# Patient Record
Sex: MALE | Race: WHITE | Employment: STUDENT | URBAN - METROPOLITAN AREA
[De-identification: names, ages, dates, MRNs, and addresses within clinical notes are randomized per-mention and may not be internally consistent; named-entity substitution may affect disease eponyms.]

---

## 2021-08-16 ENCOUNTER — OFFICE VISIT (OUTPATIENT)
Dept: INTERNAL MEDICINE CLINIC | Facility: CLINIC | Age: 21
End: 2021-08-16
Payer: COMMERCIAL

## 2021-08-16 VITALS
RESPIRATION RATE: 16 BRPM | HEIGHT: 66 IN | OXYGEN SATURATION: 98 % | HEART RATE: 78 BPM | SYSTOLIC BLOOD PRESSURE: 118 MMHG | WEIGHT: 134 LBS | DIASTOLIC BLOOD PRESSURE: 64 MMHG | BODY MASS INDEX: 21.53 KG/M2

## 2021-08-16 DIAGNOSIS — Z00.00 ROUTINE HEALTH MAINTENANCE: ICD-10-CM

## 2021-08-16 DIAGNOSIS — Z00.00 ANNUAL PHYSICAL EXAM: Primary | ICD-10-CM

## 2021-08-16 DIAGNOSIS — F84.0 AUTISM DISORDER: ICD-10-CM

## 2021-08-16 PROCEDURE — 3008F BODY MASS INDEX DOCD: CPT | Performed by: INTERNAL MEDICINE

## 2021-08-16 PROCEDURE — 99385 PREV VISIT NEW AGE 18-39: CPT | Performed by: INTERNAL MEDICINE

## 2021-08-16 PROCEDURE — 3078F DIAST BP <80 MM HG: CPT | Performed by: INTERNAL MEDICINE

## 2021-08-16 PROCEDURE — 3074F SYST BP LT 130 MM HG: CPT | Performed by: INTERNAL MEDICINE

## 2021-08-16 NOTE — PROGRESS NOTES
Laine Moss III is a 22year old male. HPI:   No chief complaint on file. Keisha Marinelli is here with his mom and dad. History obtained from mother. Keisha Marinelli diagnosed with autism. He is in special needs school.   He sees Linnea Enamorado who he has an intere in no apparent distress  SKIN:  no rashes , no suspicious lesions  HEENT: atraumatic. Pharynx normal without exudate. EYES:  PERRL. Sclera anicteric. NECK:  Supple,  no adenopathy,  thyroid normal  LUNGS:  clear to auscultation.   Effort normal  CARDIO: Abdominal Pain, N/V/D

## 2022-06-21 ENCOUNTER — TELEPHONE (OUTPATIENT)
Dept: INTERNAL MEDICINE CLINIC | Facility: CLINIC | Age: 22
End: 2022-06-21

## 2022-06-21 NOTE — TELEPHONE ENCOUNTER
Formed printed and left at . Spoke to pt mother and relayed instructions. Pt mother verbalized understanding and agrees with try and  forms today.

## 2022-06-21 NOTE — TELEPHONE ENCOUNTER
Received fax from mother for school form for medication authorization administration. Form filled out and signed by Dr. Lindsay Balderas. Copy made and sent to scanning.

## 2022-06-21 NOTE — TELEPHONE ENCOUNTER
Patient's mom moriahernestocr Siri is calling she will pick the form up in the office.   If possible she would like to pick this up today 6/21  Please call when ready [phone]

## 2023-12-01 ENCOUNTER — OFFICE VISIT (OUTPATIENT)
Dept: INTERNAL MEDICINE CLINIC | Facility: CLINIC | Age: 23
End: 2023-12-01

## 2023-12-01 VITALS
HEIGHT: 66 IN | DIASTOLIC BLOOD PRESSURE: 70 MMHG | SYSTOLIC BLOOD PRESSURE: 124 MMHG | RESPIRATION RATE: 20 BRPM | WEIGHT: 157 LBS | TEMPERATURE: 98 F | OXYGEN SATURATION: 100 % | BODY MASS INDEX: 25.23 KG/M2 | HEART RATE: 87 BPM

## 2023-12-01 DIAGNOSIS — Z00.00 ANNUAL PHYSICAL EXAM: Primary | ICD-10-CM

## 2023-12-01 DIAGNOSIS — F84.0 AUTISM: ICD-10-CM

## 2023-12-01 PROCEDURE — 99395 PREV VISIT EST AGE 18-39: CPT | Performed by: INTERNAL MEDICINE

## 2023-12-01 PROCEDURE — 3078F DIAST BP <80 MM HG: CPT | Performed by: INTERNAL MEDICINE

## 2023-12-01 PROCEDURE — 3008F BODY MASS INDEX DOCD: CPT | Performed by: INTERNAL MEDICINE

## 2023-12-01 PROCEDURE — 3074F SYST BP LT 130 MM HG: CPT | Performed by: INTERNAL MEDICINE

## 2023-12-10 LAB
ABSOLUTE BASOPHILS: 50 CELLS/UL (ref 0–200)
ABSOLUTE EOSINOPHILS: 138 CELLS/UL (ref 15–500)
ABSOLUTE LYMPHOCYTES: 1920 CELLS/UL (ref 850–3900)
ABSOLUTE MONOCYTES: 347 CELLS/UL (ref 200–950)
ABSOLUTE NEUTROPHILS: 3047 CELLS/UL (ref 1500–7800)
ALBUMIN/GLOBULIN RATIO: 2.2 (CALC) (ref 1–2.5)
ALBUMIN: 4.6 G/DL (ref 3.6–5.1)
ALKALINE PHOSPHATASE: 64 U/L (ref 36–130)
ALT: 50 U/L (ref 9–46)
AST: 31 U/L (ref 10–40)
BASOPHILS: 0.9 %
BILIRUBIN, TOTAL: 0.7 MG/DL (ref 0.2–1.2)
BUN: 13 MG/DL (ref 7–25)
CALCIUM: 9.6 MG/DL (ref 8.6–10.3)
CARBON DIOXIDE: 28 MMOL/L (ref 20–32)
CHLORIDE: 102 MMOL/L (ref 98–110)
CHOL/HDLC RATIO: 4.5 (CALC)
CHOLESTEROL, TOTAL: 198 MG/DL
CREATININE: 0.94 MG/DL (ref 0.6–1.24)
EGFR: 118 ML/MIN/1.73M2
EOSINOPHILS: 2.5 %
GLOBULIN: 2.1 G/DL (CALC) (ref 1.9–3.7)
GLUCOSE: 90 MG/DL (ref 65–99)
HDL CHOLESTEROL: 44 MG/DL
HEMATOCRIT: 46.4 % (ref 38.5–50)
HEMOGLOBIN A1C: 4.9 % OF TOTAL HGB
HEMOGLOBIN: 15.6 G/DL (ref 13.2–17.1)
LDL-CHOLESTEROL: 127 MG/DL (CALC)
LYMPHOCYTES: 34.9 %
MCH: 30.2 PG (ref 27–33)
MCHC: 33.6 G/DL (ref 32–36)
MCV: 89.9 FL (ref 80–100)
MONOCYTES: 6.3 %
MPV: 10.3 FL (ref 7.5–12.5)
NEUTROPHILS: 55.4 %
NON-HDL CHOLESTEROL: 154 MG/DL (CALC)
PLATELET COUNT: 281 THOUSAND/UL (ref 140–400)
POTASSIUM: 4.3 MMOL/L (ref 3.5–5.3)
PROTEIN, TOTAL: 6.7 G/DL (ref 6.1–8.1)
RDW: 12.1 % (ref 11–15)
RED BLOOD CELL COUNT: 5.16 MILLION/UL (ref 4.2–5.8)
SODIUM: 139 MMOL/L (ref 135–146)
TRIGLYCERIDES: 157 MG/DL
TSH: 0.72 MIU/L (ref 0.4–4.5)
VITAMIN D, 25-OH, TOTAL: 38 NG/ML (ref 30–100)
WHITE BLOOD CELL COUNT: 5.5 THOUSAND/UL (ref 3.8–10.8)

## 2023-12-11 ENCOUNTER — TELEPHONE (OUTPATIENT)
Dept: INTERNAL MEDICINE CLINIC | Facility: CLINIC | Age: 23
End: 2023-12-11

## 2023-12-11 NOTE — TELEPHONE ENCOUNTER
Please call mother Nikki Hassan and let her know her son Frederick's test results came out I believe satisfactory. His chemistries were good. His blood count was good. His thyroid was good. His cholesterol was just a little bit elevated but for now other than watching high fatty foods nothing else to be done about that and he is hemoglobin A1c was good at 4.9. Therefore no diabetes. Left copy in outbox if she wishes to have it mailed to her.

## 2023-12-11 NOTE — TELEPHONE ENCOUNTER
Called patient , spoke with mother per HIPAA and relayed DR. ISSA message - verbalized understanding    Mailed results to patients home

## 2024-11-22 ENCOUNTER — OFFICE VISIT (OUTPATIENT)
Dept: INTERNAL MEDICINE CLINIC | Facility: CLINIC | Age: 24
End: 2024-11-22

## 2024-11-22 VITALS
SYSTOLIC BLOOD PRESSURE: 112 MMHG | BODY MASS INDEX: 24.59 KG/M2 | OXYGEN SATURATION: 97 % | DIASTOLIC BLOOD PRESSURE: 62 MMHG | RESPIRATION RATE: 16 BRPM | WEIGHT: 153 LBS | TEMPERATURE: 98 F | HEART RATE: 68 BPM | HEIGHT: 66 IN

## 2024-11-22 DIAGNOSIS — Z00.00 ROUTINE HEALTH MAINTENANCE: ICD-10-CM

## 2024-11-22 DIAGNOSIS — F84.0 AUTISM (HCC): ICD-10-CM

## 2024-11-22 DIAGNOSIS — Z00.00 ANNUAL PHYSICAL EXAM: Primary | ICD-10-CM

## 2024-11-22 NOTE — PROGRESS NOTES
Frederick Granado III is a 24 year old male.  HPI:     Chief Complaint   Patient presents with    Physical     ANNUAL PHYSICAL        Frederick is here with his father Frederick.    He is here for annual physical.  He needs some paperwork filled out for [facility].    He is stable.  He has no cardiac pulmonary GI or  complaints.  Labs were requested to be done.  Order given.    We talked about vaccines.  I did give  father what we have in our vaccine record.  It looks like he may be due for Tdap and flu vaccine.  However it was felt there is no interested in flu vaccine.    He does see Dr.Anju Winston for his autism  Current Outpatient Medications   Medication Sig Dispense Refill    nystatin 334715 UNIT/ML Mouth/Throat Suspension Take 5 mL (500,000 Units total) by mouth in the morning and 5 mL (500,000 Units total) before bedtime.        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      Social History:  Social History     Socioeconomic History    Marital status: Single   Tobacco Use    Smoking status: Never     Passive exposure: Never    Smokeless tobacco: Never   Vaping Use    Vaping status: Never Used   Substance and Sexual Activity    Alcohol use: Never    Drug use: Never        REVIEW OF SYSTEMS:   GENERAL HEALTH:  feels well otherwise  RESPIRATORY:  Voices no shortness of breath with exertion or cough  CARDIOVASCULAR:  Voices no chest pain on exertion or shortness of breath  GI:   Voices no abdominal pain or changes of bowels   :Viices no urning or frequency of urination.  NEURO:  Voices no  headaches or dizziness    EXAM:   /62   Pulse 68   Temp 97.8 °F (36.6 °C) (Oral)   Resp 16   Ht 5' 6\" (1.676 m)   Wt 153 lb (69.4 kg)   SpO2 97%   BMI 24.69 kg/m²     GENERAL:  well developed, well nourished, in no apparent distress  SKIN:  no rashes ,   HEENT: atraumatic.    Pharynx normal without exudate.  EYES:  PERRL. Sclera anicteric.  NECK:  Supple,  no  adenopathy,  thyroid normal  LUNGS:  clear to auscultation.  Effort normal  CARDIO:  RRR without murmur.   S1 and S2 normal  GI:  good BS's,  no masses,   HSM or tenderness  EXTREMITIES : no cyanosis, clubbing or edema    ASSESSMENT AND PLAN:     1. Annual physical exam  Annual physical.  - COMP METABOLIC PANEL [80536] [Q]  - LIPID PANEL [7600] [Q]  - TSH W REFLEX TO FREE T4 [37723][Q]  - VITAMIN D, SCREEN [36057][Q]; Future  - CBC W PLATELET; NO DIFF [1759][Q]  - URINALYSIS, ROUTINE [5463][Q]  - VITAMIN D, SCREEN [65463][Q]    2. Autism (HCC)  Stable.  He follows with     3. Routine health maintenance  See above for vaccine discussion.    This visit was 30 minutes.  I spent 10 minutes before visit preparing and reviewing old records.  Greater than 50% of the visit was engaged in counseling and review of past data.     The patient indicates understanding of these issues and agrees to the plan.    Dhiraj Lopez MD  11/22/2024  2:27 PM

## 2024-12-14 LAB
ALBUMIN/GLOBULIN RATIO: 2.1 (CALC) (ref 1–2.5)
ALBUMIN: 4.9 G/DL (ref 3.6–5.1)
ALKALINE PHOSPHATASE: 75 U/L (ref 36–130)
ALT: 56 U/L (ref 9–46)
APPEARANCE: CLEAR
AST: 32 U/L (ref 10–40)
BILIRUBIN, TOTAL: 0.7 MG/DL (ref 0.2–1.2)
BILIRUBIN: NEGATIVE
BUN: 13 MG/DL (ref 7–25)
CALCIUM: 9.7 MG/DL (ref 8.6–10.3)
CARBON DIOXIDE: 28 MMOL/L (ref 20–32)
CHLORIDE: 101 MMOL/L (ref 98–110)
CHOL/HDLC RATIO: 4.2 (CALC)
CHOLESTEROL, TOTAL: 179 MG/DL
COLOR: YELLOW
CREATININE: 0.83 MG/DL (ref 0.6–1.24)
EGFR: 126 ML/MIN/1.73M2
GLOBULIN: 2.3 G/DL (CALC) (ref 1.9–3.7)
GLUCOSE: 114 MG/DL (ref 65–99)
GLUCOSE: NEGATIVE
HDL CHOLESTEROL: 43 MG/DL
HEMATOCRIT: 47.6 % (ref 38.5–50)
HEMOGLOBIN: 15.9 G/DL (ref 13.2–17.1)
KETONES: NEGATIVE
LDL-CHOLESTEROL: 110 MG/DL (CALC)
LEUKOCYTE ESTERASE: NEGATIVE
MCH: 30.8 PG (ref 27–33)
MCHC: 33.4 G/DL (ref 32–36)
MCV: 92.2 FL (ref 80–100)
MPV: 10.3 FL (ref 7.5–12.5)
NITRITE: NEGATIVE
NON-HDL CHOLESTEROL: 136 MG/DL (CALC)
OCCULT BLOOD: NEGATIVE
PH: 8.5 (ref 5–8)
PLATELET COUNT: 292 THOUSAND/UL (ref 140–400)
POTASSIUM: 4.2 MMOL/L (ref 3.5–5.3)
PROTEIN, TOTAL: 7.2 G/DL (ref 6.1–8.1)
PROTEIN: NEGATIVE
RDW: 12 % (ref 11–15)
RED BLOOD CELL COUNT: 5.16 MILLION/UL (ref 4.2–5.8)
SODIUM: 139 MMOL/L (ref 135–146)
SPECIFIC GRAVITY: 1.01 (ref 1–1.03)
TRIGLYCERIDES: 151 MG/DL
TSH W/REFLEX TO FT4: 0.94 MIU/L (ref 0.4–4.5)
VITAMIN D, 25-OH, TOTAL: 41 NG/ML (ref 30–100)
WHITE BLOOD CELL COUNT: 5.5 THOUSAND/UL (ref 3.8–10.8)

## 2024-12-18 ENCOUNTER — TELEPHONE (OUTPATIENT)
Dept: INTERNAL MEDICINE CLINIC | Facility: CLINIC | Age: 24
End: 2024-12-18

## 2024-12-18 DIAGNOSIS — R73.9 ELEVATED BLOOD SUGAR: Primary | ICD-10-CM

## 2024-12-18 DIAGNOSIS — R74.8 ELEVATED LIVER ENZYMES: ICD-10-CM

## 2024-12-18 NOTE — TELEPHONE ENCOUNTER
We can let patient's mother Maximo know the following regarding Garcia test results.  She may have already seen the results.    His blood sugar was just a little bit elevated at 114.  I am thinking that he was fasting for 10 hours?.  I do not think that this is very elevated but we start diagnosing diabetes at 126.    2.  One  liver enzyme called ALT was mildly elevated.  I do not think that this is a major problem.  He had it mildly elevated a year ago.    3.  His cholesterol and triglycerides were just minimally elevated.  I am not too concerned about this for now.    4.  I think next steps should be to repeat his blood sugar along with getting a hemoglobin A1c in 1 month.  I will also add some other labs for his mildly elevated liver enzyme.    5.   He should also get a liver ultrasound.    6.   We can talk after these next results and decide what if anything should be done for Frederick.    7.   We can mail results if they wish a mailed copy

## 2024-12-18 NOTE — TELEPHONE ENCOUNTER
Patient's mother called and verified that Dr ISSA orders were received and she will have liver US scheduled and labs done as advise

## 2024-12-18 NOTE — TELEPHONE ENCOUNTER
To GARTH Barnes...    Patient did not fast for the most recent labs but aware to fast for next set     Called patients mother as requested by Dr ISSA and phone disconnects after several rings x 2.    I called patient's home T# and patients father answered. HIPAA verified.    Relayed Dr ISSA message and father voiced understanding

## 2024-12-19 ENCOUNTER — HOSPITAL ENCOUNTER (OUTPATIENT)
Dept: ULTRASOUND IMAGING | Age: 24
Discharge: HOME | End: 2024-12-19
Attending: INTERNAL MEDICINE
Payer: COMMERCIAL

## 2024-12-19 ENCOUNTER — TELEPHONE (OUTPATIENT)
Dept: INTERNAL MEDICINE CLINIC | Facility: CLINIC | Age: 24
End: 2024-12-19

## 2024-12-19 DIAGNOSIS — R74.8 ELEVATED LIVER ENZYMES: ICD-10-CM

## 2024-12-19 DIAGNOSIS — R73.9 ELEVATED BLOOD SUGAR: ICD-10-CM

## 2024-12-19 PROCEDURE — 76700 US EXAM ABDOM COMPLETE: CPT | Performed by: INTERNAL MEDICINE

## 2024-12-20 NOTE — TELEPHONE ENCOUNTER
We can let she know that Garcia liver ultrasound shows \"hepatic steatosis.  This is commonly known as fatty liver.  For now the plan would be to mildly limit Garcia carbohydrates.  I do not want him to lose too much weight.  His BMI is at the upper limits of normal so may be a slow weight loss of 5 to 10 pounds over the next 6 months may be of benefit.  I do not think this is a major problem for Frederick for now.    Lets repeat labs in 1 month and then reassess.  Orders are in place.    If liver enzyme still remains elevated which was not very elevated in the first place I may suggest Frederick see a gastroenterologist just because of his young age and making sure there is nothing other than what we are doing.

## 2024-12-23 NOTE — TELEPHONE ENCOUNTER
's message below relayed to patient's mother Maximo (per HIPAA). Maximo verbalized understanding and agreement with the plan, she will discuss this with the patient.

## 2024-12-24 ENCOUNTER — LAB ENCOUNTER (OUTPATIENT)
Dept: LAB | Facility: HOSPITAL | Age: 24
End: 2024-12-24
Attending: INTERNAL MEDICINE
Payer: COMMERCIAL

## 2024-12-24 DIAGNOSIS — E83.89 NUTRITIONAL DISORDER DUE TO CALCIUM-PHOSPHORUS IMBALANCE: Primary | ICD-10-CM

## 2024-12-24 DIAGNOSIS — R53.83 FATIGUE: ICD-10-CM

## 2024-12-24 DIAGNOSIS — J30.2 SEASONAL ALLERGIC RHINITIS: ICD-10-CM

## 2024-12-24 LAB
CORTIS SERPL-MCNC: 22.2 UG/DL
EST. AVERAGE GLUCOSE BLD GHB EST-MCNC: 88 MG/DL (ref 68–126)
HBA1C MFR BLD: 4.7 % (ref ?–5.7)
TESTOST SERPL-MCNC: 359.6 NG/DL

## 2024-12-24 PROCEDURE — 36415 COLL VENOUS BLD VENIPUNCTURE: CPT

## 2024-12-24 PROCEDURE — 84403 ASSAY OF TOTAL TESTOSTERONE: CPT

## 2024-12-24 PROCEDURE — 82533 TOTAL CORTISOL: CPT

## 2024-12-24 PROCEDURE — 84630 ASSAY OF ZINC: CPT

## 2024-12-24 PROCEDURE — 83036 HEMOGLOBIN GLYCOSYLATED A1C: CPT

## 2024-12-26 LAB — ZINC: 81 UG/DL

## 2025-01-03 ENCOUNTER — TELEPHONE (OUTPATIENT)
Dept: INTERNAL MEDICINE CLINIC | Facility: CLINIC | Age: 25
End: 2025-01-03

## 2025-01-03 DIAGNOSIS — R73.9 ELEVATED BLOOD SUGAR: ICD-10-CM

## 2025-01-03 DIAGNOSIS — R74.8 ELEVATED LIVER ENZYMES: ICD-10-CM

## 2025-01-03 NOTE — TELEPHONE ENCOUNTER
Pt. Called asking to have his current lab orders changed to [facility].  Per pt's insurance he needs to go to [facility].  Please send Maximo a [email or web address] message after orders have been changed.

## 2025-01-20 LAB
% SATURATION: 47 % (CALC) (ref 20–48)
ALBUMIN/GLOBULIN RATIO: 2.2 (CALC) (ref 1–2.5)
ALBUMIN: 4.6 G/DL (ref 3.6–5.1)
ALKALINE PHOSPHATASE: 71 U/L (ref 36–130)
ALT: 61 U/L (ref 9–46)
AST: 36 U/L (ref 10–40)
BILIRUBIN, DIRECT: 0.1 MG/DL
BILIRUBIN, INDIRECT: 0.7 MG/DL (CALC) (ref 0.2–1.2)
BILIRUBIN, TOTAL: 0.8 MG/DL (ref 0.2–1.2)
BUN: 13 MG/DL (ref 7–25)
CALCIUM: 9.3 MG/DL (ref 8.6–10.3)
CARBON DIOXIDE: 28 MMOL/L (ref 20–32)
CHLORIDE: 104 MMOL/L (ref 98–110)
CREATININE: 0.82 MG/DL (ref 0.6–1.24)
EGFR: 127 ML/MIN/1.73M2
FERRITIN: 170 NG/ML (ref 38–380)
GLOBULIN: 2.1 G/DL (CALC) (ref 1.9–3.7)
GLUCOSE: 72 MG/DL (ref 65–99)
HEMOGLOBIN A1C: 4.8 % OF TOTAL HGB
IRON BINDING CAPACITY: 262 MCG/DL (CALC) (ref 250–425)
IRON, TOTAL: 122 MCG/DL (ref 50–195)
POTASSIUM: 4 MMOL/L (ref 3.5–5.3)
PROTEIN, TOTAL: 6.7 G/DL (ref 6.1–8.1)
SODIUM: 140 MMOL/L (ref 135–146)

## 2025-02-04 ENCOUNTER — TELEPHONE (OUTPATIENT)
Dept: INTERNAL MEDICINE CLINIC | Facility: CLINIC | Age: 25
End: 2025-02-04

## 2025-02-05 NOTE — TELEPHONE ENCOUNTER
Please let mother Maximo know that Garcia 1 liver enzyme still is mildly elevated.  I am not too concerned about this.  I think we can follow this.  I will be seeing Maximo I believe later this week and I can discuss with her more at that time.

## 2025-02-07 ENCOUNTER — TELEPHONE (OUTPATIENT)
Dept: INTERNAL MEDICINE CLINIC | Facility: CLINIC | Age: 25
End: 2025-02-07

## 2025-02-07 DIAGNOSIS — R74.8 ELEVATED LIVER ENZYMES: Primary | ICD-10-CM

## 2025-02-07 NOTE — TELEPHONE ENCOUNTER
Discussed with Maximo.  Mother.  Reviewed minimally elevated liver enzyme.  Liver ultrasound showed fatty liver.  We discussed seeing gastroenterology but both of us feel that is not necessary at this time.  Will repeat hepatic function in May.  Maximo felt comfortable with this approach.

## 2025-05-08 ENCOUNTER — PATIENT MESSAGE (OUTPATIENT)
Dept: INTERNAL MEDICINE CLINIC | Facility: CLINIC | Age: 25
End: 2025-05-08

## 2025-05-11 LAB
ALBUMIN/GLOBULIN RATIO: 2 (CALC) (ref 1–2.5)
ALBUMIN: 4.7 G/DL (ref 3.6–5.1)
ALKALINE PHOSPHATASE: 73 U/L (ref 36–130)
ALT: 34 U/L (ref 9–46)
AST: 25 U/L (ref 10–40)
BILIRUBIN, DIRECT: 0.1 MG/DL
BILIRUBIN, INDIRECT: 0.7 MG/DL (CALC) (ref 0.2–1.2)
BILIRUBIN, TOTAL: 0.8 MG/DL (ref 0.2–1.2)
GLOBULIN: 2.3 G/DL (CALC) (ref 1.9–3.7)
PROTEIN, TOTAL: 7 G/DL (ref 6.1–8.1)

## 2025-05-12 ENCOUNTER — TELEPHONE (OUTPATIENT)
Dept: INTERNAL MEDICINE CLINIC | Facility: CLINIC | Age: 25
End: 2025-05-12

## 2025-05-12 NOTE — TELEPHONE ENCOUNTER
Spoke to patient's mother Maximo (OK per HIPAA and relayed MD message.      Mom inquring whether other PCP providers in office will accept new patient   .  Thank you for your message. We can confirm that effective June 30, 2025 your physician at [facility] will no longer be practicing at [facility]. We are unable to provide information about where your physician may be practicing after that date. The physicians may choose to share more about their plans after June 30th online or otherwise.      Pt verbalized understanding and agrees with plan.

## 2025-05-12 NOTE — TELEPHONE ENCOUNTER
Please let patient's mother Maximo know that Frederick's liver enzymes are now normal.  This is a good sign.    I would think that it may benefit him from having another blood test in 6 months.    I believe she knows I will be retiring with last day June 13.    My patients records will be automatically changed to Dr.Nuha Early just so that records are attached to a physician for continuity of care.    They can have Frederick see any primary care physicians who is accepting new patients.

## 2025-11-07 ENCOUNTER — APPOINTMENT (OUTPATIENT)
Age: 25
End: 2025-11-07